# Patient Record
Sex: MALE | Race: OTHER | HISPANIC OR LATINO | URBAN - METROPOLITAN AREA
[De-identification: names, ages, dates, MRNs, and addresses within clinical notes are randomized per-mention and may not be internally consistent; named-entity substitution may affect disease eponyms.]

---
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
## 2021-09-10 NOTE — ED PEDIATRIC NURSE NOTE - NS ED NURSE LEVEL OF CONSCIOUSNESS SPEECH
[address] [facility], [address]      PATIENT'S NAME: Mary Crocker PHYSICIAN: Jake Zhu MD   PATIENT ACCOUNT #: [de-identified] LOCATION: [address] [place] [address] RECORD #: [id number] DATE OF BIRTH: [birth year]
Speaking Coherently

## 2023-05-02 NOTE — ED ADULT TRIAGE NOTE - NS ED NOTE AC HIGH RISK COUNTRIES
RETURN TO WORK/SCHOOL FORM    5/2/2023    Re: Romulo Larsen  2007      To Whom It May Concern:     Romulo Larsen was seen in clinic today. We plan to adjust his medication schedule and start 30 mg of methylphenidate in the morning to be given at home before school, and 30 mg at noon to be given at school if possible.   Please fax any required paperwork to our clinic at [phone] or have Romulo bring this form in to clinic follow up visit to complete.     Benita Kay Behm, MD  5/2/2023 10:43 AM    No

## 2025-03-06 NOTE — ED ADULT NURSE NOTE - EXTENSIONS OF SELF_ADULT
[facility]  Pre-Endoscopy History and Physical     Barron Gtz MRN# [id number]   YOB: 1969 Age: 56 year old     Date of Procedure: 3/6/2025  Primary care provider: [facility], Cleveland Area Hospital – Cleveland Vincent Mishra MD,MD  March 6, 2025      [facility]  Ph: [phone] Fax: [phone]    " None